# Patient Record
Sex: MALE | Race: WHITE | Employment: FULL TIME | URBAN - METROPOLITAN AREA
[De-identification: names, ages, dates, MRNs, and addresses within clinical notes are randomized per-mention and may not be internally consistent; named-entity substitution may affect disease eponyms.]

---

## 2017-10-30 ENCOUNTER — HOSPITAL ENCOUNTER (EMERGENCY)
Age: 37
Discharge: HOME | End: 2017-10-30
Attending: EMERGENCY MEDICINE | Admitting: EMERGENCY MEDICINE
Payer: COMMERCIAL

## 2017-10-30 ENCOUNTER — APPOINTMENT (OUTPATIENT)
Dept: GENERAL RADIOLOGY | Age: 37
End: 2017-10-30
Attending: EMERGENCY MEDICINE
Payer: COMMERCIAL

## 2017-10-30 VITALS
RESPIRATION RATE: 12 BRPM | WEIGHT: 172 LBS | BODY MASS INDEX: 26.07 KG/M2 | TEMPERATURE: 98.3 F | HEIGHT: 68 IN | SYSTOLIC BLOOD PRESSURE: 117 MMHG | DIASTOLIC BLOOD PRESSURE: 73 MMHG | OXYGEN SATURATION: 97 % | HEART RATE: 72 BPM

## 2017-10-30 DIAGNOSIS — R07.9 ACUTE CHEST PAIN: Primary | ICD-10-CM

## 2017-10-30 LAB
ALBUMIN SERPL-MCNC: 3.9 G/DL (ref 3.5–5)
ALBUMIN/GLOB SERPL: 1.2 {RATIO} (ref 1.1–2.2)
ALP SERPL-CCNC: 57 U/L (ref 45–117)
ALT SERPL-CCNC: 34 U/L (ref 12–78)
ANION GAP SERPL CALC-SCNC: 7 MMOL/L (ref 5–15)
APPEARANCE UR: CLEAR
AST SERPL-CCNC: 22 U/L (ref 15–37)
ATRIAL RATE: 88 BPM
BACTERIA URNS QL MICRO: NEGATIVE /HPF
BASOPHILS # BLD: 0 K/UL (ref 0–0.1)
BASOPHILS NFR BLD: 0 % (ref 0–1)
BILIRUB SERPL-MCNC: 0.8 MG/DL (ref 0.2–1)
BILIRUB UR QL: NEGATIVE
BUN SERPL-MCNC: 13 MG/DL (ref 6–20)
BUN/CREAT SERPL: 12 (ref 12–20)
CALCIUM SERPL-MCNC: 8.5 MG/DL (ref 8.5–10.1)
CALCULATED P AXIS, ECG09: 29 DEGREES
CALCULATED R AXIS, ECG10: 16 DEGREES
CALCULATED T AXIS, ECG11: 25 DEGREES
CHLORIDE SERPL-SCNC: 107 MMOL/L (ref 97–108)
CK MB CFR SERPL CALC: NORMAL % (ref 0–2.5)
CK MB SERPL-MCNC: <1 NG/ML (ref 5–25)
CK SERPL-CCNC: 187 U/L (ref 39–308)
CO2 SERPL-SCNC: 28 MMOL/L (ref 21–32)
COLOR UR: NORMAL
CREAT SERPL-MCNC: 1.13 MG/DL (ref 0.7–1.3)
D DIMER PPP FEU-MCNC: <0.17 MG/L FEU (ref 0–0.65)
DIAGNOSIS, 93000: NORMAL
EOSINOPHIL # BLD: 0.1 K/UL (ref 0–0.4)
EOSINOPHIL NFR BLD: 1 % (ref 0–7)
EPITH CASTS URNS QL MICRO: NORMAL /LPF
ERYTHROCYTE [DISTWIDTH] IN BLOOD BY AUTOMATED COUNT: 13.4 % (ref 11.5–14.5)
GLOBULIN SER CALC-MCNC: 3.2 G/DL (ref 2–4)
GLUCOSE SERPL-MCNC: 94 MG/DL (ref 65–100)
GLUCOSE UR STRIP.AUTO-MCNC: NEGATIVE MG/DL
HCT VFR BLD AUTO: 44.6 % (ref 36.6–50.3)
HGB BLD-MCNC: 15.4 G/DL (ref 12.1–17)
HGB UR QL STRIP: NEGATIVE
HYALINE CASTS URNS QL MICRO: NORMAL /LPF (ref 0–5)
KETONES UR QL STRIP.AUTO: NEGATIVE MG/DL
LEUKOCYTE ESTERASE UR QL STRIP.AUTO: NEGATIVE
LIPASE SERPL-CCNC: 122 U/L (ref 73–393)
LYMPHOCYTES # BLD: 1.9 K/UL (ref 0.8–3.5)
LYMPHOCYTES NFR BLD: 32 % (ref 12–49)
MAGNESIUM SERPL-MCNC: 1.9 MG/DL (ref 1.6–2.4)
MCH RBC QN AUTO: 31.2 PG (ref 26–34)
MCHC RBC AUTO-ENTMCNC: 34.5 G/DL (ref 30–36.5)
MCV RBC AUTO: 90.3 FL (ref 80–99)
MONOCYTES # BLD: 0.5 K/UL (ref 0–1)
MONOCYTES NFR BLD: 8 % (ref 5–13)
NEUTS SEG # BLD: 3.5 K/UL (ref 1.8–8)
NEUTS SEG NFR BLD: 59 % (ref 32–75)
NITRITE UR QL STRIP.AUTO: NEGATIVE
P-R INTERVAL, ECG05: 154 MS
PH UR STRIP: 5.5 [PH] (ref 5–8)
PLATELET # BLD AUTO: 241 K/UL (ref 150–400)
POTASSIUM SERPL-SCNC: 3.8 MMOL/L (ref 3.5–5.1)
PROT SERPL-MCNC: 7.1 G/DL (ref 6.4–8.2)
PROT UR STRIP-MCNC: NEGATIVE MG/DL
Q-T INTERVAL, ECG07: 348 MS
QRS DURATION, ECG06: 80 MS
QTC CALCULATION (BEZET), ECG08: 421 MS
RBC # BLD AUTO: 4.94 M/UL (ref 4.1–5.7)
RBC #/AREA URNS HPF: NORMAL /HPF (ref 0–5)
SODIUM SERPL-SCNC: 142 MMOL/L (ref 136–145)
SP GR UR REFRACTOMETRY: 1.02 (ref 1–1.03)
TROPONIN I BLD-MCNC: <0.04 NG/ML (ref 0–0.08)
TROPONIN I SERPL-MCNC: <0.04 NG/ML
UA: UC IF INDICATED,UAUC: NORMAL
UROBILINOGEN UR QL STRIP.AUTO: 1 EU/DL (ref 0.2–1)
VENTRICULAR RATE, ECG03: 88 BPM
WBC # BLD AUTO: 5.9 K/UL (ref 4.1–11.1)
WBC URNS QL MICRO: NORMAL /HPF (ref 0–4)

## 2017-10-30 PROCEDURE — 80053 COMPREHEN METABOLIC PANEL: CPT | Performed by: EMERGENCY MEDICINE

## 2017-10-30 PROCEDURE — 99285 EMERGENCY DEPT VISIT HI MDM: CPT

## 2017-10-30 PROCEDURE — 85025 COMPLETE CBC W/AUTO DIFF WBC: CPT | Performed by: EMERGENCY MEDICINE

## 2017-10-30 PROCEDURE — 84484 ASSAY OF TROPONIN QUANT: CPT | Performed by: EMERGENCY MEDICINE

## 2017-10-30 PROCEDURE — 85379 FIBRIN DEGRADATION QUANT: CPT | Performed by: EMERGENCY MEDICINE

## 2017-10-30 PROCEDURE — 83735 ASSAY OF MAGNESIUM: CPT | Performed by: EMERGENCY MEDICINE

## 2017-10-30 PROCEDURE — 81001 URINALYSIS AUTO W/SCOPE: CPT | Performed by: EMERGENCY MEDICINE

## 2017-10-30 PROCEDURE — 83690 ASSAY OF LIPASE: CPT | Performed by: EMERGENCY MEDICINE

## 2017-10-30 PROCEDURE — 82550 ASSAY OF CK (CPK): CPT | Performed by: EMERGENCY MEDICINE

## 2017-10-30 PROCEDURE — 93005 ELECTROCARDIOGRAM TRACING: CPT

## 2017-10-30 PROCEDURE — 71020 XR CHEST PA LAT: CPT

## 2017-10-30 PROCEDURE — 36415 COLL VENOUS BLD VENIPUNCTURE: CPT | Performed by: EMERGENCY MEDICINE

## 2017-10-30 RX ORDER — OMEPRAZOLE 20 MG/1
20 CAPSULE, DELAYED RELEASE ORAL DAILY
Qty: 20 CAP | Refills: 0 | Status: SHIPPED | OUTPATIENT
Start: 2017-10-30

## 2017-10-30 RX ORDER — BUPROPION HYDROCHLORIDE 300 MG/1
300 TABLET ORAL
COMMUNITY

## 2017-10-30 NOTE — DISCHARGE INSTRUCTIONS
Chest Pain: Care Instructions  Your Care Instructions    There are many things that can cause chest pain. Some are not serious and will get better on their own in a few days. But some kinds of chest pain need more testing and treatment. Your doctor may have recommended a follow-up visit in the next 8 to 12 hours. If you are not getting better, you may need more tests or treatment. Even though your doctor has released you, you still need to watch for any problems. The doctor carefully checked you, but sometimes problems can develop later. If you have new symptoms or if your symptoms do not get better, get medical care right away. If you have worse or different chest pain or pressure that lasts more than 5 minutes or you passed out (lost consciousness), call 911 or seek other emergency help right away. A medical visit is only one step in your treatment. Even if you feel better, you still need to do what your doctor recommends, such as going to all suggested follow-up appointments and taking medicines exactly as directed. This will help you recover and help prevent future problems. How can you care for yourself at home? · Rest until you feel better. · Take your medicine exactly as prescribed. Call your doctor if you think you are having a problem with your medicine. · Do not drive after taking a prescription pain medicine. When should you call for help? Call 911 if:  ? · You passed out (lost consciousness). ? · You have severe difficulty breathing. ? · You have symptoms of a heart attack. These may include:  ¨ Chest pain or pressure, or a strange feeling in your chest.  ¨ Sweating. ¨ Shortness of breath. ¨ Nausea or vomiting. ¨ Pain, pressure, or a strange feeling in your back, neck, jaw, or upper belly or in one or both shoulders or arms. ¨ Lightheadedness or sudden weakness. ¨ A fast or irregular heartbeat.   After you call 911, the  may tell you to chew 1 adult-strength or 2 to 4 low-dose aspirin. Wait for an ambulance. Do not try to drive yourself. ?Call your doctor today if:  ? · You have any trouble breathing. ? · Your chest pain gets worse. ? · You are dizzy or lightheaded, or you feel like you may faint. ? · You are not getting better as expected. ? · You are having new or different chest pain. Where can you learn more? Go to http://salomon-felice.info/. Enter A120 in the search box to learn more about \"Chest Pain: Care Instructions. \"  Current as of: March 20, 2017  Content Version: 11.4  © 5390-7693 [employer]. Care instructions adapted under license by [employer] (which disclaims liability or warranty for this information). If you have questions about a medical condition or this instruction, always ask your healthcare professional. [place] [address] any warranty or liability for your use of this information. We hope that we have addressed all of your medical concerns. The examination and treatment you received in the Emergency Department were for an emergent problem and were not intended as complete care. It is important that you follow up with your healthcare provider(s) for ongoing care. If your symptoms worsen or do not improve as expected, and you are unable to reach your usual health care provider(s), you should return to the Emergency Department. Today's healthcare is undergoing tremendous change, and patient satisfaction surveys are one of the many tools to assess the quality of medical care. You may receive a survey from the [employer] organization regarding your experience in the Emergency Department. I hope that your experience has been completely positive, particularly the medical care that I provided. As such, please participate in the survey; anything less than excellent does not meet my expectations or intentions.         [facility] and [employer] participate in nationally recognized quality of care measures. If your blood pressure is greater than 120/80, as reported below, we urge that you seek medical care to address the potential of high blood pressure, commonly known as hypertension. Hypertension can be hereditary or can be caused by certain medical conditions, pain, stress, or \"white coat syndrome. \"       Please make an appointment with your health care provider(s) for follow up of your Emergency Department visit. VITALS:   Patient Vitals for the past 8 hrs:   Temp Pulse Resp BP SpO2   10/30/17 1100 - 76 11 113/73 (!) 73 %   10/30/17 1030 - 70 12 107/72 -   10/30/17 0930 - 73 11 118/77 -   10/30/17 0830 - 75 13 113/81 95 %   10/30/17 0800 - 86 14 134/81 96 %   10/30/17 0745 - - - 155/90 98 %   10/30/17 0742 98.5 °F (36.9 °C) 95 16 155/90 98 %          Thank you for allowing us to provide you with medical care today. We realize that you have many choices for your emergency care needs. Please choose us in the future for any continued health care needs. Rupal Gaviria  [facility], [address] [place] [address].   Office: [phone]            Recent Results (from the past 24 hour(s))   EKG, 12 LEAD, INITIAL    Collection Time: 10/30/17  7:40 AM   Result Value Ref Range    Ventricular Rate 88 BPM    Atrial Rate 88 BPM    P-R Interval 154 ms    QRS Duration 80 ms    Q-T Interval 348 ms    QTC Calculation (Bezet) 421 ms    Calculated P Axis 29 degrees    Calculated R Axis 16 degrees    Calculated T Axis 25 degrees    Diagnosis       Normal sinus rhythm Nonspecific T wave abnormality  No previous ECGs available  Confirmed by Uma Morrow M.D., Gordan Buerger ([id number]) on 10/30/2017 10:58:23 AM     CBC WITH AUTOMATED DIFF    Collection Time: 10/30/17  8:02 AM   Result Value Ref Range    WBC 5.9 4.1 - 11.1 K/uL    RBC 4.94 4.10 - 5.70 M/uL    HGB 15.4 12.1 - 17.0 g/dL    HCT 44.6 36.6 - 50.3 %    MCV 90.3 80.0 - 99.0 FL    MCH 31.2 26.0 - 34.0 PG    MCHC 34.5 30.0 - 36.5 g/dL    RDW 13.4 11.5 - 14.5 %    PLATELET 943 903 - 899 K/uL    NEUTROPHILS 59 32 - 75 %    LYMPHOCYTES 32 12 - 49 %    MONOCYTES 8 5 - 13 %    EOSINOPHILS 1 0 - 7 %    BASOPHILS 0 0 - 1 %    ABS. NEUTROPHILS 3.5 1.8 - 8.0 K/UL    ABS. LYMPHOCYTES 1.9 0.8 - 3.5 K/UL    ABS. MONOCYTES 0.5 0.0 - 1.0 K/UL    ABS. EOSINOPHILS 0.1 0.0 - 0.4 K/UL    ABS. BASOPHILS 0.0 0.0 - 0.1 K/UL   METABOLIC PANEL, COMPREHENSIVE    Collection Time: 10/30/17  8:02 AM   Result Value Ref Range    Sodium 142 136 - 145 mmol/L    Potassium 3.8 3.5 - 5.1 mmol/L    Chloride 107 97 - 108 mmol/L    CO2 28 21 - 32 mmol/L    Anion gap 7 5 - 15 mmol/L    Glucose 94 65 - 100 mg/dL    BUN 13 6 - 20 MG/DL    Creatinine 1.13 0.70 - 1.30 MG/DL    BUN/Creatinine ratio 12 12 - 20      GFR est AA >60 >60 ml/min/1.73m2    GFR est non-AA >60 >60 ml/min/1.73m2    Calcium 8.5 8.5 - 10.1 MG/DL    Bilirubin, total 0.8 0.2 - 1.0 MG/DL    ALT (SGPT) 34 12 - 78 U/L    AST (SGOT) 22 15 - 37 U/L    Alk.  phosphatase 57 45 - 117 U/L    Protein, total 7.1 6.4 - 8.2 g/dL    Albumin 3.9 3.5 - 5.0 g/dL    Globulin 3.2 2.0 - 4.0 g/dL    A-G Ratio 1.2 1.1 - 2.2     D DIMER    Collection Time: 10/30/17  8:02 AM   Result Value Ref Range    D-dimer <0.17 0.00 - 0.65 mg/L FEU   LIPASE    Collection Time: 10/30/17  8:02 AM   Result Value Ref Range    Lipase 122 73 - 393 U/L   MAGNESIUM    Collection Time: 10/30/17  8:02 AM   Result Value Ref Range    Magnesium 1.9 1.6 - 2.4 mg/dL   TROPONIN I    Collection Time: 10/30/17  8:02 AM   Result Value Ref Range    Troponin-I, Qt. <0.04 <0.05 ng/mL   CK W/ CKMB & INDEX    Collection Time: 10/30/17  8:02 AM   Result Value Ref Range     39 - 308 U/L    CK - MB <1.0 <3.6 NG/ML    CK-MB Index Cannot be calculated 0 - 2.5     URINALYSIS W/ REFLEX CULTURE    Collection Time: 10/30/17  9:23 AM   Result Value Ref Range    Color YELLOW/STRAW      Appearance CLEAR CLEAR      Specific gravity 1.022 1.003 - 1.030      pH (UA) 5.5 5.0 - 8.0      Protein NEGATIVE  NEG mg/dL    Glucose NEGATIVE  NEG mg/dL    Ketone NEGATIVE  NEG mg/dL    Bilirubin NEGATIVE  NEG      Blood NEGATIVE  NEG      Urobilinogen 1.0 0.2 - 1.0 EU/dL    Nitrites NEGATIVE  NEG      Leukocyte Esterase NEGATIVE  NEG      WBC 0-4 0 - 4 /hpf    RBC 0-5 0 - 5 /hpf    Epithelial cells FEW FEW /lpf    Bacteria NEGATIVE  NEG /hpf    UA:UC IF INDICATED CULTURE NOT INDICATED BY UA RESULT CNI      Hyaline cast 0-2 0 - 5 /lpf       Xr Chest Pa Lat    Result Date: 10/30/2017  EXAM:  XR CHEST PA LAT. INDICATION: Chest pain. COMPARISON: None. FINDINGS: PA and lateral radiographs of the chest were obtained. The patient is on a cardiac monitor. Lungs: The lungs are clear of mass, nodule, airspace disease or edema. Pleura: There is no pleural effusion or pneumothorax. Mediastinum: The cardiac and mediastinal contours and pulmonary vascularity are normal. Bones and soft tissues: The bones and soft tissues are within normal limits.      IMPRESSION: Normal chest.

## 2017-10-30 NOTE — ED PROVIDER NOTES
HPI Comments: 40 y.o. male with past medical history significant for anxiety who presents from home with wife at bedside with chief complaint of CP. The pt c/o constant substernal pressure like CP that woke up him from sleep last night. The pt thought it was anxiety and went back to sleep. The pt woke up whit morning with the same pain and decided to come to the ED for an evaluation. The pain is not mechanical. The pt denies pleuritic pain, SOB, cough, and fever. There are no other acute medical concerns at this time. Social hx: nonsmoker, no EtOH use  Significant FMHx: no young cardiac hx. PCP: No primary care provider on file. Note written by Junior Baeza, as dictated by Deanna Coulter MD 7:53 AM      The history is provided by the patient. No  was used. Past Medical History:   Diagnosis Date    Psychiatric disorder     anxiety       No past surgical history on file. No family history on file. Social History     Social History    Marital status: N/A     Spouse name: N/A    Number of children: N/A    Years of education: N/A     Occupational History    Not on file. Social History Main Topics    Smoking status: Never Smoker    Smokeless tobacco: Never Used    Alcohol use Not on file    Drug use: Not on file    Sexual activity: Not on file     Other Topics Concern    Not on file     Social History Narrative    No narrative on file         ALLERGIES: Other medication    Review of Systems   Constitutional: Negative for fever. Respiratory: Negative for cough and shortness of breath. Cardiovascular: Positive for chest pain. All other systems reviewed and are negative.       Vitals:    10/30/17 0742   BP: 155/90   Pulse: 95   Resp: 16   Temp: 98.5 °F (36.9 °C)   SpO2: 98%   Weight: 78 kg (172 lb)   Height: 5' 8\" (1.727 m)            Physical Exam   Physical Examination: General appearance - alert, well appearing, and in no distress, oriented to person, place, and time and normal appearing weight  Eyes - pupils equal and reactive, extraocular eye movements intact  Neck - supple, no significant adenopathy  Chest - clear to auscultation, no wheezes, rales or rhonchi, symmetric air entry  Heart - normal rate, regular rhythm, normal S1, S2, no murmurs, rubs, clicks or gallops  Abdomen - soft, nontender, nondistended, no masses or organomegaly  Back exam - full range of motion, no tenderness, palpable spasm or pain on motion  Neurological - alert, oriented, normal speech, no focal findings or movement disorder noted  Musculoskeletal - no joint tenderness, deformity or swelling  Extremities - peripheral pulses normal, no pedal edema, no clubbing or cyanosis  Skin - normal coloration and turgor, no rashes, no suspicious skin lesions noted  MDM  Number of Diagnoses or Management Options     Amount and/or Complexity of Data Reviewed  Clinical lab tests: ordered and reviewed  Tests in the radiology section of CPT®: ordered and reviewed  Decide to obtain previous medical records or to obtain history from someone other than the patient: yes  Obtain history from someone other than the patient: yes (wife)  Review and summarize past medical records: yes  Independent visualization of images, tracings, or specimens: yes    Patient Progress  Patient progress: improved    ED Course       Procedures    EKG interpretation: (Preliminary)  Rhythm: normal sinus rhythm; and regular . Rate (approx.): 88; Axis: normal; WI interval: normal; QRS interval: normal ; ST/T wave: normal;      11:30 AM  The pt was updated about his lab results and plan for discharge. Will f/u with cardiology or return to ED for worsening symptoms.

## 2017-10-30 NOTE — ED TRIAGE NOTES
Patient presents to the emergency department reporting chest tightness. Patient states he woke in the middle of the night with chest tightness. Patient went back to sleep believing it was his anxiety. Patient is on a daily anti-anxiety medication. Patient woke this morning with the chest pain continuing.